# Patient Record
(demographics unavailable — no encounter records)

---

## 2024-11-25 NOTE — HISTORY OF PRESENT ILLNESS
[FreeTextEntry1] : 71 year male with pmhx of parkinsons dx, hypothyroid  comes in for follow up on his leg swelling. notices it is worse by the end of the day. occasional tingling -no pain. no difficulty breathing. He denies having any chest pain, SOB, MARTIN, dizziness, palpitations, orthopnea, PND or syncope. He is walking without a problem. checks his bp at home -130s/80s typically.

## 2024-11-25 NOTE — ASSESSMENT
[FreeTextEntry1] : leg swelling -trace leg swelling -suspect venous insufficiency - discussed lifestyle modifications including leg elevation and compression stockings  -will update echo with next visit   HTN -bp mildly elevated -142/84 today, not orthostatic in the office  -will start HCTZ 12.5mg MWF to help with both leg swelling and HTN  -will titrate bp meds slowly d/t PD -does have a dilated aorta, goal is <130/80  follow up in 1 mo for visit, echo, and labs (started hctz) with Dr Gastelum pt seen and examined with dr joseph who agrees with the plan as stated above

## 2025-01-02 NOTE — ASSESSMENT
[FreeTextEntry1] : An EKG was performed to evaluate for arrhythmia and ischemia.  At the time of the patient's visit an Echocardiogram was performed to evaluate LV function. At the time of the visit the results were reviewed with patient. Minimal pericardial effusion seen without hemodynamic significance  Hypertension--We discussed a goal of /80 or lower in the office. BP  above  goal. Take Enalapril as instructed  I encouraged continued risk factor reduction and gradual increase in aerobic activity as tolerated   33  minutes were spent discussing cardiac risk excluding procedure time

## 2025-07-16 NOTE — DATA REVIEWED
[FreeTextEntry1] : Labs: \par  - 03/08/22: S.creat 1, Free T4 1.5, TSH 2.11,  thyroglobulin 5.44, thyroglobulin Ab <20 \par  - 2/1/2022: Total thyroidectomy Surgical Pathology Report. Final Diagnosis: 1. L thyroid with retrosternal extension: Hyperplastic multinodular goiter. 2. R thyroid and isthmus: Hyperplastic multinodular goiter. \par  - 11/02/21: FNA biopsy- addendum: Cytopathology of bilat. conglomeration of thyroid nodules demonstrates benign nodular goiter, concordant with radiologic findings (signed 11/03/21 by Harpreet Lutz MD- [phone])\par  11/2/2021 cytology report then added and when you pathology: \par  Right lobe upper to mid pole: Savage II (5.6 x 2.8 cm solid nodule)\par  Live: left lobe upper to mid pole Savage II (5.7x 2.7 solid nodule\par  - 09/20/21: s.creat 1.05, Ca 10, TSH 0.83\par  - 06/25/21: A1c 5.1%, LDL-c 80, s.creat, eFGR 89, \par  - 03/14/19: Thyroid goiter biopsy: bilaterally benign nodular glands (Savage II) Right mid lower pole 4cm, right 5cm nodule. Left 4.2cm nodule.\par  \par  Imaging:\par  - 02/01/19 Thyroid US compared to 10/19/17: Right 5.4cm nodule in mid lower pole and 2.5cm nodule in anterior lower poll. There were no notable nodules or cysts on left.

## 2025-07-16 NOTE — HISTORY OF PRESENT ILLNESS
[FreeTextEntry1] : 73 y/o male pt, with Hypothyroidism, s/p total thyroidectomy (2/1/2022) for multinodular goiter (dx in 2014), presents today for endocrine f/u.  Other PMHx: b/l thyroid biopsy, Parkinson's disease (dx 2019), tracheal stenosis PSHx: None. FHx: No known FHx of thyroid cancer or thyroid conditions. SHx: No eTOH. NKDA PCP: Dr. Rommel Fong Phone: [phone], Fax: [phone]  06/24/2022  Review of pt's chart: - Pt underwent total thyroidectomy on 2/12022 to remove a gigantic thyroid. Pathology report confirms hyperplastic multinodular goiter with no evidence of tumor.   Patient is feeling well with not obstructive symptoms.  Her blood pressure was found to be a bit high so currently work-up is being conducted by his PCP and neurologist.  Denies obstructive symptoms and palpitations.    07/10/2023 CC: "My weight goes up and down".  Pt is accompanied by his wife today. As per pt's wife he was diagnosed with Parkinson's and he sees a neurologist.  Pt denies diarrheas, breathing difficulties, dysphagia, and polyuria. He endorses voice "hoarseness". He currently sees a pulmonologist since surgery. As per pt's wife he wakes up coughing often.   07/15/2024  Pt has /89 and BMI 24.05. Pt lost 3 lbs in 7 months.  CC: "I'm doing well." Pt reports that his PCP, Dr. Ajit Morris, recommended a thyroid US. Pt followed up with ENT Dr. Da Silva last year, who stated that he did not require any intervention.   07/16/2025 Pt has /86 and BMI 24.2. No significant weight change. CC: "I am feeling pretty good".  Today he s accompanied by his wife. As per pt's wife, he recently saw his neurologist and he recently saw his PCP as well.  He denies constipation, difficulty breathing, difficulty swallowing, and voice changes.  [Medications verified as per pt on 07/16/2025] Current Medications: Levothyroxine 125 mcg qd, Amantadine, Rasagiline. Pt's spouse states that he will be discontinuing night time medication as directed by his physician.

## 2025-07-16 NOTE — PHYSICAL EXAM
[No Acute Distress] : no acute distress [Normal Sclera/Conjunctiva] : normal sclera/conjunctiva [Normal Outer Ear/Nose] : the ears and nose were normal in appearance [No Respiratory Distress] : no respiratory distress [Normal Rate] : heart rate was normal [Regular Rhythm] : with a regular rhythm [Soft] : abdomen soft [Spine Straight] : spine straight [No Stigmata of Cushings Syndrome] : no stigmata of Cushings Syndrome [No Rash] : no rash [Oriented x3] : oriented to person, place, and time [de-identified] : no masses, nodules, no lymph node enlargement  [de-identified] : ambulates with a cane

## 2025-07-16 NOTE — ADDENDUM
[FreeTextEntry1] :  I, Lizeth Hinojosa, act solely as a scribe for Dr. Janusz Quigley on this date. 07/16/2025

## 2025-07-16 NOTE — REASON FOR VISIT
[Follow - Up] : a follow-up visit [Hypothyroidism] : hypothyroidism [Thyroid nodule/ MNG] : thyroid nodule/ MNG [Spouse] : spouse

## 2025-07-16 NOTE — HISTORY OF PRESENT ILLNESS
[FreeTextEntry1] : 73 y/o male pt, with Hypothyroidism, s/p total thyroidectomy (2/1/2022) for multinodular goiter (dx in 2014), presents today for endocrine f/u.  Other PMHx: b/l thyroid biopsy, Parkinson's disease (dx 2019), tracheal stenosis PSHx: None. FHx: No known FHx of thyroid cancer or thyroid conditions. SHx: No eTOH. NKDA PCP: Dr. Rommel Fong Phone: [phone], Fax: [phone]  06/24/2022  Review of pt's chart: - Pt underwent total thyroidectomy on 2/12022 to remove a gigantic thyroid. Pathology report confirms hyperplastic multinodular goiter with no evidence of tumor.   Patient is feeling well with not obstructive symptoms.  Her blood pressure was found to be a bit high so currently work-up is being conducted by his PCP and neurologist.  Denies obstructive symptoms and palpitations.    07/10/2023 CC: "My weight goes up and down".  Pt is accompanied by his wife today. As per pt's wife he was diagnosed with Parkinson's and he sees a neurologist.  Pt denies diarrheas, breathing difficulties, dysphagia, and polyuria. He endorses voice "hoarseness". He currently sees a pulmonologist since surgery. As per pt's wife he wakes up coughing often.   07/15/2024  Pt has /89 and BMI 24.05. Pt lost 3 lbs in 7 months.  CC: "I'm doing well." Pt reports that his PCP, Dr. Ajit Morris, recommended a thyroid US. Pt followed up with ENT Dr. Da Silva last year, who stated that he did not require any intervention.   07/16/2025 Pt has /86 and BMI 24.2. No significant weight change. CC: "I am feeling pretty good".  Today he s accompanied by his wife. As per pt's wife, he recently saw his neurologist and he recently saw his PCP as well.  He denies constipation, difficulty breathing, difficulty swallowing, and voice changes.  [Medications verified as per pt on 07/16/2025] Current Medications: Levothyroxine 125 mcg qd, Amantadine, Rasagiline. Pt's spouse states that he will be discontinuing night time medication as directed by his physician.

## 2025-07-16 NOTE — DATA REVIEWED
[FreeTextEntry1] : Labs: \par  - 03/08/22: S.creat 1, Free T4 1.5, TSH 2.11,  thyroglobulin 5.44, thyroglobulin Ab <20 \par  - 2/1/2022: Total thyroidectomy Surgical Pathology Report. Final Diagnosis: 1. L thyroid with retrosternal extension: Hyperplastic multinodular goiter. 2. R thyroid and isthmus: Hyperplastic multinodular goiter. \par  - 11/02/21: FNA biopsy- addendum: Cytopathology of bilat. conglomeration of thyroid nodules demonstrates benign nodular goiter, concordant with radiologic findings (signed 11/03/21 by Harpreet Lutz MD- [phone])\par  11/2/2021 cytology report then added and when you pathology: \par  Right lobe upper to mid pole: Shal[place] II (5.6 x 2.8 cm solid nodule)\par  Live: left lobe upper to mid pole Shalimar II (5.7x 2.7 solid nodule\par  - 09/20/21: s.creat 1.05, Ca 10, TSH 0.83\par  - 06/25/21: A1c 5.1%, LDL-c 80, s.creat, eFGR 89, \par  - 03/14/19: Thyroid goiter biopsy: bilaterally benign nodular glands (Shalimar II) Right mid lower pole 4cm, right 5cm nodule. Left 4.2cm nodule.\par  \par  Imaging:\par  - 02/01/19 Thyroid US compared to 10/19/17: Right 5.4cm nodule in mid lower pole and 2.5cm nodule in anterior lower poll. There were no notable nodules or cysts on left.

## 2025-07-16 NOTE — END OF VISIT
[FreeTextEntry3] :  All medical record entries made by the Scribe were at my, Dr. Janusz Quigley, direction and personally dictated by me on 07/16/2025. I have reviewed the chart and agree that the record accurately reflects my personal performance of the history, physical exam, and assessment and plan. I have also personally directed, reviewed, and agreed with the chart.  [Time Spent: ___ minutes] : I have spent [unfilled] minutes of time on the encounter which excludes teaching and separately reported services.

## 2025-07-16 NOTE — ASSESSMENT
[Self Monitoring of Blood Glucose] : self monitoring of blood glucose [Levothyroxine] : The patient was instructed to take Levothyroxine on an empty stomach, separate from vitamins, and wait at least 30 minutes before eating

## 2025-07-16 NOTE — REVIEW OF SYSTEMS
[Negative] : Heme/Lymph [Dysphagia] : no dysphagia [Dysphonia] : no dysphonia [Shortness Of Breath] : no shortness of breath [Diarrhea] : no diarrhea

## 2025-07-16 NOTE — PHYSICAL EXAM
[No Acute Distress] : no acute distress [Normal Sclera/Conjunctiva] : normal sclera/conjunctiva [Normal Outer Ear/Nose] : the ears and nose were normal in appearance [No Respiratory Distress] : no respiratory distress [Normal Rate] : heart rate was normal [Regular Rhythm] : with a regular rhythm [Soft] : abdomen soft [Spine Straight] : spine straight [No Stigmata of Cushings Syndrome] : no stigmata of Cushings Syndrome [No Rash] : no rash [Oriented x3] : oriented to person, place, and time [de-identified] : no masses, nodules, no lymph node enlargement  [de-identified] : ambulates with a cane